# Patient Record
Sex: FEMALE | Race: WHITE | NOT HISPANIC OR LATINO | Employment: FULL TIME
[De-identification: names, ages, dates, MRNs, and addresses within clinical notes are randomized per-mention and may not be internally consistent; named-entity substitution may affect disease eponyms.]

---
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
## 2019-12-09 ENCOUNTER — COMMUNICATION - HEALTHEAST (OUTPATIENT)
Dept: FAMILY MEDICINE | Facility: CLINIC | Age: 19
End: 2019-12-09

## 2020-01-27 ENCOUNTER — COMMUNICATION - HEALTHEAST (OUTPATIENT)
Dept: TELEHEALTH | Facility: CLINIC | Age: 20
End: 2020-01-27

## 2020-01-27 ENCOUNTER — OFFICE VISIT - HEALTHEAST (OUTPATIENT)
Dept: FAMILY MEDICINE | Facility: CLINIC | Age: 20
End: 2020-01-27

## 2020-01-27 DIAGNOSIS — R79.89 ELEVATED LFTS: ICD-10-CM

## 2020-01-27 DIAGNOSIS — L70.9 ACNE, UNSPECIFIED ACNE TYPE: ICD-10-CM

## 2020-01-27 LAB
ALBUMIN SERPL-MCNC: 4.5 G/DL (ref 3.5–5)
ALP SERPL-CCNC: 54 U/L (ref 45–120)
ALT SERPL W P-5'-P-CCNC: 136 U/L (ref 0–45)
ANION GAP SERPL CALCULATED.3IONS-SCNC: 10 MMOL/L (ref 5–18)
AST SERPL W P-5'-P-CCNC: 401 U/L (ref 0–40)
BILIRUB SERPL-MCNC: 0.8 MG/DL (ref 0–1)
BUN SERPL-MCNC: 13 MG/DL (ref 8–22)
CALCIUM SERPL-MCNC: 9.9 MG/DL (ref 8.5–10.5)
CHLORIDE BLD-SCNC: 102 MMOL/L (ref 98–107)
CO2 SERPL-SCNC: 25 MMOL/L (ref 22–31)
CREAT SERPL-MCNC: 0.78 MG/DL (ref 0.6–1.1)
GFR SERPL CREATININE-BSD FRML MDRD: >60 ML/MIN/1.73M2
GLUCOSE BLD-MCNC: 93 MG/DL (ref 70–125)
POTASSIUM BLD-SCNC: 4.3 MMOL/L (ref 3.5–5)
PROT SERPL-MCNC: 7.3 G/DL (ref 6–8)
SODIUM SERPL-SCNC: 137 MMOL/L (ref 136–145)

## 2020-01-27 ASSESSMENT — MIFFLIN-ST. JEOR: SCORE: 1358.94

## 2020-01-28 ENCOUNTER — RECORDS - HEALTHEAST (OUTPATIENT)
Dept: ADMINISTRATIVE | Facility: OTHER | Age: 20
End: 2020-01-28

## 2020-01-28 LAB
HBV SURFACE AB SERPL IA-ACNC: NEGATIVE M[IU]/ML
HBV SURFACE AG SERPL QL IA: NEGATIVE
HCV AB SERPL QL IA: NEGATIVE

## 2020-01-29 LAB — DHEA-S SERPL-MCNC: 153 UG/DL (ref 65–380)

## 2020-01-30 ENCOUNTER — COMMUNICATION - HEALTHEAST (OUTPATIENT)
Dept: FAMILY MEDICINE | Facility: CLINIC | Age: 20
End: 2020-01-30

## 2020-01-30 LAB
SHBG SERPL-SCNC: 57 NMOL/L (ref 30–135)
TESTOST FREE SERPL-MCNC: 0.62 NG/DL (ref 0.08–0.74)
TESTOST SERPL-MCNC: 50 NG/DL (ref 8–60)

## 2020-02-06 ENCOUNTER — RECORDS - HEALTHEAST (OUTPATIENT)
Dept: ADMINISTRATIVE | Facility: OTHER | Age: 20
End: 2020-02-06

## 2020-03-09 ENCOUNTER — COMMUNICATION - HEALTHEAST (OUTPATIENT)
Dept: SCHEDULING | Facility: CLINIC | Age: 20
End: 2020-03-09

## 2021-01-04 ENCOUNTER — RECORDS - HEALTHEAST (OUTPATIENT)
Dept: ADMINISTRATIVE | Facility: OTHER | Age: 21
End: 2021-01-04

## 2021-01-18 ENCOUNTER — RECORDS - HEALTHEAST (OUTPATIENT)
Dept: ADMINISTRATIVE | Facility: OTHER | Age: 21
End: 2021-01-18

## 2021-03-24 ENCOUNTER — RECORDS - HEALTHEAST (OUTPATIENT)
Dept: ADMINISTRATIVE | Facility: OTHER | Age: 21
End: 2021-03-24

## 2021-04-27 ENCOUNTER — OFFICE VISIT - HEALTHEAST (OUTPATIENT)
Dept: FAMILY MEDICINE | Facility: CLINIC | Age: 21
End: 2021-04-27

## 2021-04-27 DIAGNOSIS — B35.3 TINEA PEDIS, UNSPECIFIED LATERALITY: ICD-10-CM

## 2021-04-27 DIAGNOSIS — R79.89 ELEVATED LFTS: ICD-10-CM

## 2021-04-27 DIAGNOSIS — J01.01 ACUTE RECURRENT MAXILLARY SINUSITIS: ICD-10-CM

## 2021-04-27 DIAGNOSIS — R52 BODY ACHES: ICD-10-CM

## 2021-04-27 DIAGNOSIS — B99.9 RECURRENT INFECTIONS: ICD-10-CM

## 2021-04-27 LAB
ALBUMIN SERPL-MCNC: 3.7 G/DL (ref 3.5–5)
ALP SERPL-CCNC: 57 U/L (ref 45–120)
ALT SERPL W P-5'-P-CCNC: 11 U/L (ref 0–45)
AST SERPL W P-5'-P-CCNC: 27 U/L (ref 0–40)
BASOPHILS # BLD AUTO: 0.1 THOU/UL (ref 0–0.2)
BASOPHILS NFR BLD AUTO: 1 % (ref 0–2)
BILIRUB DIRECT SERPL-MCNC: 0.2 MG/DL
BILIRUB SERPL-MCNC: 0.5 MG/DL (ref 0–1)
C REACTIVE PROTEIN LHE: 0.9 MG/DL (ref 0–0.8)
EOSINOPHIL # BLD AUTO: 0.1 THOU/UL (ref 0–0.4)
EOSINOPHIL NFR BLD AUTO: 2 % (ref 0–6)
ERYTHROCYTE [DISTWIDTH] IN BLOOD BY AUTOMATED COUNT: 11.9 % (ref 11–14.5)
ERYTHROCYTE [SEDIMENTATION RATE] IN BLOOD BY WESTERGREN METHOD: 8 MM/HR (ref 0–20)
HCT VFR BLD AUTO: 39.1 % (ref 35–47)
HGB BLD-MCNC: 12.9 G/DL (ref 12–16)
IMM GRANULOCYTES # BLD: 0 THOU/UL
IMM GRANULOCYTES NFR BLD: 0 %
LYMPHOCYTES # BLD AUTO: 1.7 THOU/UL (ref 0.8–4.4)
LYMPHOCYTES NFR BLD AUTO: 25 % (ref 20–40)
MCH RBC QN AUTO: 30.5 PG (ref 27–34)
MCHC RBC AUTO-ENTMCNC: 33 G/DL (ref 32–36)
MCV RBC AUTO: 92 FL (ref 80–100)
MONOCYTES # BLD AUTO: 0.6 THOU/UL (ref 0–0.9)
MONOCYTES NFR BLD AUTO: 10 % (ref 2–10)
MONOCYTES NFR BLD AUTO: NEGATIVE %
NEUTROPHILS # BLD AUTO: 4.2 THOU/UL (ref 2–7.7)
NEUTROPHILS NFR BLD AUTO: 63 % (ref 50–70)
PLATELET # BLD AUTO: 272 THOU/UL (ref 140–440)
PMV BLD AUTO: 9.9 FL (ref 7–10)
PROT SERPL-MCNC: 6.7 G/DL (ref 6–8)
RBC # BLD AUTO: 4.23 MILL/UL (ref 3.8–5.4)
WBC: 6.6 THOU/UL (ref 4–11)

## 2021-04-27 ASSESSMENT — MIFFLIN-ST. JEOR: SCORE: 1407.25

## 2021-04-28 ENCOUNTER — COMMUNICATION - HEALTHEAST (OUTPATIENT)
Dept: FAMILY MEDICINE | Facility: CLINIC | Age: 21
End: 2021-04-28

## 2021-05-26 ENCOUNTER — RECORDS - HEALTHEAST (OUTPATIENT)
Dept: ADMINISTRATIVE | Facility: CLINIC | Age: 21
End: 2021-05-26

## 2021-06-04 VITALS
HEART RATE: 83 BPM | BODY MASS INDEX: 20.46 KG/M2 | OXYGEN SATURATION: 99 % | WEIGHT: 127.3 LBS | SYSTOLIC BLOOD PRESSURE: 100 MMHG | DIASTOLIC BLOOD PRESSURE: 56 MMHG | HEIGHT: 66 IN

## 2021-06-04 NOTE — TELEPHONE ENCOUNTER
New Appointment Needed  What is the reason for the visit:    New patient  for acne, patient has been seen by a dermatologist & treated with no improvement. Discuss possible testing testosterone levels. Patients sister is a current patient, Dr Ocampo agreed to take her as a patient, per Mom.  Provider Preference: Dr Ocampo  How soon do you need to be seen?: when available  Waitlist offered?: No  Okay to leave a detailed message:  Yes

## 2021-06-04 NOTE — TELEPHONE ENCOUNTER
Patient Returning Call  Reason for call:  Returning call  Information relayed to patient:  Mom aware Dr. Ocampo will see patient.  PSR not able to set up visit as blocked because doctor is not set up in system to take New Patient or Establish Care patient.  Patient has additional questions:  Yes  If YES, what are your questions/concerns:  Please call mom Marilyn to set up this appointment for patient.  Patient will be off from college tomorrow and they will take anytime to come in to see Dr. Ocampo, however, would prefer Tuesday or Thursday if possible. Patient goes back to college the first or second week of January and may need to leave sooner for work.   -Mom also explained that the antibiotic treatment prescribed for her daughter's face, made it worse and she has learned through research that some people have hormonal acne, and mom is wondering if testosterone levels can be checked.   Okay to leave a detailed message?: Yes

## 2021-06-05 VITALS
WEIGHT: 139 LBS | BODY MASS INDEX: 22.34 KG/M2 | DIASTOLIC BLOOD PRESSURE: 60 MMHG | TEMPERATURE: 98.2 F | HEIGHT: 66 IN | HEART RATE: 100 BPM | SYSTOLIC BLOOD PRESSURE: 100 MMHG | OXYGEN SATURATION: 100 %

## 2021-06-05 NOTE — TELEPHONE ENCOUNTER
Patient Returning Call  Reason for call:  Return call.  Information relayed to patient:  Patient's mom, Marilyn, was informed of Dr. Ocampo's message below.  Patient has additional questions:  No  If YES, what are your questions/concerns:  n/a  Okay to leave a detailed message?: No call back needed    Caller stated the patient is not on an antibiotic from her dermatologist. Caller stated she will let the patient know and the patient will call back to set up a lab appointment.

## 2021-06-05 NOTE — TELEPHONE ENCOUNTER
Left message to call back for: pt mom Marilyn  Information to relay to patient:  Please relay Dr. Ocampo's message.

## 2021-06-05 NOTE — TELEPHONE ENCOUNTER
Your hormones are normal in regards to testosterone which is great news.     Your hepatitis tests are normal. You have not developed immunity to hepatitis B, you may want to consider trying the hepatitis B series one more time to see if we can get you to respond to this.     Your liver tests are up some still. Are you taking an oral antibiotic from the dermatologist as well? If you are not I think that you should stop the spironolactone and we can recheck your tests in a few weeks to make sure they are coming down.

## 2021-06-05 NOTE — PROGRESS NOTES
ASSESSMENT/PLAN:       1. Acne, unspecified acne type  -The patient is concerned that there is a hormonal problem, and we are checking basic labs as listed below which thus far are normal.  Discussed they are treating her for hormonal problem with spironolactone and the next step would be considering birth control.  In the meantime I encouraged her to increase the frequency of her Retin-A and she should plan on following up here in the next several months.  - Dehydroepiandrosterone Sulfate, Serum (DHEAS)  - Testosterone, Free and Total  - clindamycin (CLINDAGEL) 1 % gel; Apply topically each morning  Dispense: 30 g; Refill: 2  - tretinoin (RETIN-A) 0.025 % cream; Apply topically at bedtime.  Dispense: 45 g; Refill: 2  - spironolactone (ALDACTONE) 50 MG tablet; Take 1 tablet (50 mg total) by mouth 2 (two) times a day.  Dispense: 60 tablet; Refill: 2    2. Elevated LFTs  -LFTs were elevated from an outside clinic, I am going to check some basic labs including a recheck of her LFTs as well as hepatitis B and C status.  And then from there treat accordingly.  - Comprehensive Metabolic Panel  - Hepatitis B Surface Antibody (Anti-HBs) Vaccine Check  - Hepatitis C Antibody (Anti-HCV)  - Hepatitis B Surface Antigen (HBsAG)      Return in about 3 months (around 4/27/2020).      Melisa Ocampo MD      PROGRESS NOTE   1/27/2020    SUBJECTIVE:  Hannah E Tietz is a 19 y.o. female  who presents for acne and elvated liver tests.     [facility]. She got the clindamycin, retin A and spironolactone.     She was on the 0.05% of Retin A and they did get 0.025% as she got a lot of dryness with the higher strength one. She hasn't used this one yet. She does have the 0.05% and is using this one more sparingly and that is helping. She is worried because things are not going away. She is worried that there is something that is missing since it is not going away. She is not on birth control.      Her periods are regular, she will have bleeding for almost 7 days, she has had increased cramping in the last few months. She does have 25 days in between cycles.     She did have some lab work done at the beginning of January and she did have elevated LFTs. She does not drink alcohol really. This concerns her as well. Her homeopathic doctor recommended that she see someone about this.     Chief Complaint   Patient presents with     Acne     Patient would like to discuss her facial acne. Patient does have some acne on her chest and her upper back. Patient is followed by a dermatologist.      Follow-up     Patient would like to follow up on lab work that was recently done.          Patient Active Problem List   Diagnosis     Attention deficit disorder       Current Outpatient Medications   Medication Sig Dispense Refill     clindamycin (CLINDAGEL) 1 % gel Apply topically each morning 30 g 2     spironolactone (ALDACTONE) 50 MG tablet Take 1 tablet (50 mg total) by mouth 2 (two) times a day. 60 tablet 2     tretinoin (RETIN-A) 0.05 % cream        tretinoin (RETIN-A) 0.025 % cream Apply topically at bedtime. 45 g 2     No current facility-administered medications for this visit.      Past Medical History:   Diagnosis Date     Anxiety      Past Surgical History:   Procedure Laterality Date     ANKLE SURGERY Right        family history includes Anxiety disorder in her father and mother; Breast cancer in her maternal grandmother; Depression in her father and mother; Diabetes in her paternal grandmother; No Medical Problems in her maternal grandfather and paternal grandfather.      Social History     Tobacco Use   Smoking Status Never Smoker   Smokeless Tobacco Never Used       With the exception of the aforementioned issues, 12 point, comprehensive ROS was done and was negative.     OBJECTIVE:        Recent Results (from the past 240 hour(s))   Comprehensive Metabolic Panel   Result Value Ref Range    Sodium 137 136 -  "145 mmol/L    Potassium 4.3 3.5 - 5.0 mmol/L    Chloride 102 98 - 107 mmol/L    CO2 25 22 - 31 mmol/L    Anion Gap, Calculation 10 5 - 18 mmol/L    Glucose 93 70 - 125 mg/dL    BUN 13 8 - 22 mg/dL    Creatinine 0.78 0.60 - 1.10 mg/dL    GFR MDRD Af Amer >60 >60 mL/min/1.73m2    GFR MDRD Non Af Amer >60 >60 mL/min/1.73m2    Bilirubin, Total 0.8 0.0 - 1.0 mg/dL    Calcium 9.9 8.5 - 10.5 mg/dL    Protein, Total 7.3 6.0 - 8.0 g/dL    Albumin 4.5 3.5 - 5.0 g/dL    Alkaline Phosphatase 54 45 - 120 U/L     (H) 0 - 40 U/L     (H) 0 - 45 U/L   Dehydroepiandrosterone Sulfate, Serum (DHEAS)   Result Value Ref Range    DHEAS 153 65 - 380 ug/dL   Hepatitis B Surface Antibody (Anti-HBs) Vaccine Check   Result Value Ref Range    HBV Surface Ab (Vaccine Check) Negative (!) Positive   Hepatitis C Antibody (Anti-HCV)   Result Value Ref Range    Hepatitis C Ab Negative Negative   Hepatitis B Surface Antigen (HBsAG)   Result Value Ref Range    Hepatitis B Surface Ag Negative Negative       Vitals:    01/27/20 1242   BP: 100/56   Patient Site: Right Arm   Patient Position: Sitting   Cuff Size: Adult Regular   Pulse: 83   SpO2: 99%   Weight: 127 lb 4.8 oz (57.7 kg)   Height: 5' 6.3\" (1.684 m)     Weight: 127 lb 4.8 oz (57.7 kg)          Physical Exam:  GENERAL APPEARANCE: A&A, NAD, well hydrated, well nourished  SKIN:  Normal skin turgor, no lesions/rashes, moderate inflammatory acne present on the forehead and chin, no obvious cystic acne present currently   HEENT: moist mucous membranes, no rhinorrhea  NECK: Normal  CV: RRR, no M/G/R   LUNGS: CTAB  ABDOMEN: S&NT, no masses   EXTREMITY: no edema   NEURO: no gross deficits  PSYCH: affect is flat, she is well dressed and groomed, normal thought process and speech pattern     "

## 2021-06-05 NOTE — TELEPHONE ENCOUNTER
Test Results  Who is calling?:  Patient's motherMarilyn  Who ordered the test:    Melisa Ocampo MD  Type of test: Lab  Date of test:  1/27/2020  Where was the test performed:    [facility]  What are your questions/concerns?:    Patient's mother is wanting to discuss lab results with Provider to reassure patient.  Okay to leave a detailed message?:  Yes

## 2021-06-06 NOTE — TELEPHONE ENCOUNTER
Medication Request  Medication name: Oral contraceptives   Requested Pharmacy: [facility]  Reason for request: Birth control    When did you use medication last?:  NA   Patient offered appointment:  yes. The patient has an appointment with Dr. Peguero on 3/12/20. The patient's mom is wandering if Iesha needs to keep this appointment or can the birth control be ordered without appointment?  Okay to leave a detailed message: yes

## 2021-06-17 NOTE — PATIENT INSTRUCTIONS - HE
Updating some labs today to assess for mono and those inflammtory markers.    Rechecking the liver enzymes.      We'll call you when these come back.    Teraaliyahafine sent for the toe. Keep it as dry as possible.

## 2021-06-17 NOTE — PROGRESS NOTES
"Chief Complaint   Patient presents with     Sinus Problem     sinus issues on goign for 1 month, not sleeping, sinus drainage, stomach issues, nasueated , achey joints        HPI: Patient presents today with concerns of recurrent illness.  She describes them as a 24-hour illness that self resolves and comes back.  She notices sinus congestion and \"cold \"type symptoms.  Sometimes feels nauseated.  Joints are achy at times.  Bowel movements feel a little softer than usual.  Generally not sleeping as well as usual.  When she blows her nose she is getting out thick yellow mucus.  No significant cough or hemoptysis.  No unintentional weight loss.  Has felt feverish once or twice.  Last week she did feel like some of her submandibular glands were swollen.  No nighttime diaphoresis, travel outside the country, or residence in assisted/homeless shelter.  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  Ears feel fine.  No eye issues.  Rare alcohol use.  No risky sexual behavior.  No injection drug use.  Denies seasonal allergies and new pets in the house.  She does admit to being under a significant amount of life stress right now.  Patient has a history of elevated LFTs in the past last rechecked about a year ago.  Lastly, she notices a mild amount of redness and itchiness between her toes.    ROS:Review of Systems - negative except for what's listed in the HPI    SH: The Patient's  reports that she has never smoked. She has never used smokeless tobacco. She reports current alcohol use. She reports that she does not use drugs.      FH: The Patient's family history includes Anxiety disorder in her father and mother; Breast cancer in her maternal grandmother; Depression in her father and mother; Diabetes in her paternal grandmother; No Medical Problems in her maternal grandfather, paternal grandfather, and sister.     Meds:    Current Outpatient Medications on File Prior to Visit   Medication Sig Dispense Refill     " "spironolactone (ALDACTONE) 50 MG tablet Take 1 tablet (50 mg total) by mouth 2 (two) times a day. 60 tablet 2     clindamycin (CLINDAGEL) 1 % gel Apply topically each morning 30 g 2     tretinoin (RETIN-A) 0.025 % cream Apply topically at bedtime. 45 g 2     tretinoin (RETIN-A) 0.05 % cream        VESTURA, 28, 3-0.02 mg per tablet TAKE 1 TABLET BY MOUTH DAILY, TAKE ACTIVE PILLS ONLY FOR 3 CYCLES THEN TAKE 7 DAYS INACTIVE       No current facility-administered medications on file prior to visit.        O:  /60   Pulse 100   Temp 98.2  F (36.8  C)   Ht 5' 6\" (1.676 m)   Wt 139 lb (63 kg)   LMP 04/14/2021 (Approximate)   SpO2 100%   Breastfeeding No   BMI 22.44 kg/m      Physical Examination:   General appearance - alert, well appearing, and in no distress  Mental status - alert, oriented to person, place, and time  Eyes -PERRLA  Ears - bilateral TM's and external ear canals normal  Nose -off colored white/yellow discharge noted.  Mild mucosal swelling.  Mouth - mucous membranes moist. No oral lesions.  Neck - no significant adenopathy  Lymphatics - no palpable lymphadenopathy  Chest - clear to auscultation, no wheezes, rales or rhonchi, symmetric air entry  Heart - normal rate and regular rhythm, S1 and S2 normal, no murmurs noted  Neurological - alert, oriented, normal speech, no focal findings or movement disorder noted, neck supple without rigidity, cranial nerves II through XII intact, motor and sensory grossly normal bilaterally, normal muscle tone, no tremors, strength 5/5  Extremities - peripheral pulses normal, no peripheral edema  Skin -between toe #2 and 1, there is a small amount of erythema.  Not hot to touch.  No blistering.  Some peeling skin noted.      A/P:     Problem List Items Addressed This Visit     None      Visit Diagnoses     Elevated LFTs    -  Primary    Relevant Orders    Hepatic Profile (Completed)    Mononucleosis Screen (Completed)    Acute recurrent maxillary sinusitis     "    Relevant Medications    azithromycin (ZITHROMAX Z-HEATH) 250 MG tablet    Tinea pedis, unspecified laterality        Relevant Medications    terbinafine HCL (LAMISIL) 1 % cream    Body aches        Relevant Orders    Sedimentation Rate (Completed)    C-Reactive Protein(CRP) (Completed)    Recurrent infections        Relevant Medications    azithromycin (ZITHROMAX Z-HEATH) 250 MG tablet    Other Relevant Orders    HM1(CBC and Differential) (Completed)        Reviewed outside records from gastroenterology along with outside LFTs, IgG, IgM, IgE.  Suspect tinea pedis in between the toes given appearance and location.  Trial of terbinafine.  Given off-and-on illness for a month, will start with some simple labs.  Given body aches, also assess for inflammatory markers.  All these came back looking normal.  No recurrent rise in LFTs.  Start with azithromycin given purulent drainage and recurrent ill symptoms. Let me know if not improving.      1. Acute recurrent maxillary sinusitis  - azithromycin (ZITHROMAX Z-HEATH) 250 MG tablet; Take 2 tablets (500 mg) on  Day 1,  followed by 1 tablet (250 mg) once daily on Days 2 through 5.  Dispense: 6 tablet; Refill: 0    2. Elevated LFTs  - Hepatic Profile  - Mononucleosis Screen    3. Tinea pedis, unspecified laterality  - terbinafine HCL (LAMISIL) 1 % cream; Apply topically at bedtime.  Dispense: 15 g; Refill: 0    4. Body aches  - Sedimentation Rate  - C-Reactive Protein(CRP)    5. Recurrent infections  - HM1(CBC and Differential)        Vikas Reza, CNP      This note has been dictated using voice recognition software. Any grammatical or context distortions are unintentional and inherent to the software.

## 2021-06-17 NOTE — TELEPHONE ENCOUNTER
----- Message from Vikas Reza CNP sent at 4/28/2021 12:10 PM CDT -----  Please call the patient.  All of her labs came back looking good.  All the liver tests have self corrected.  No mono.  Inflammatory markers are good.  Blood counts are also normal.    Given her feelings of sickness recurrently along with the nasal congestion with colored mucus, let's start by treating like this is a respiratory infection.  I sent azithromycin to the pharmacy.  Let me know in 2 weeks if this is not improving.    Vikas Reza CNP

## 2021-06-17 NOTE — TELEPHONE ENCOUNTER
Left message to call back for: Pt.   Information to relay to patient:  Information below.     Michelle Raygoza, CMA

## 2021-08-08 ENCOUNTER — HEALTH MAINTENANCE LETTER (OUTPATIENT)
Age: 21
End: 2021-08-08

## 2021-08-24 ENCOUNTER — OFFICE VISIT (OUTPATIENT)
Dept: FAMILY MEDICINE | Facility: CLINIC | Age: 21
End: 2021-08-24
Payer: COMMERCIAL

## 2021-08-24 VITALS
OXYGEN SATURATION: 99 % | BODY MASS INDEX: 21.79 KG/M2 | HEART RATE: 99 BPM | WEIGHT: 135 LBS | TEMPERATURE: 98.5 F | RESPIRATION RATE: 18 BRPM | SYSTOLIC BLOOD PRESSURE: 124 MMHG | DIASTOLIC BLOOD PRESSURE: 69 MMHG

## 2021-08-24 DIAGNOSIS — R10.84 ABDOMINAL PAIN, GENERALIZED: ICD-10-CM

## 2021-08-24 DIAGNOSIS — N89.8 VAGINAL DISCHARGE: Primary | ICD-10-CM

## 2021-08-24 LAB
ALBUMIN UR-MCNC: NEGATIVE MG/DL
APPEARANCE UR: CLEAR
BACTERIA #/AREA URNS HPF: ABNORMAL /HPF
BILIRUB UR QL STRIP: NEGATIVE
CLUE CELLS: ABNORMAL
COLOR UR AUTO: YELLOW
GLUCOSE UR STRIP-MCNC: NEGATIVE MG/DL
HCG UR QL: NEGATIVE
HGB UR QL STRIP: ABNORMAL
KETONES UR STRIP-MCNC: NEGATIVE MG/DL
LEUKOCYTE ESTERASE UR QL STRIP: NEGATIVE
NITRATE UR QL: NEGATIVE
PH UR STRIP: 6 [PH] (ref 5–8)
RBC #/AREA URNS AUTO: ABNORMAL /HPF
SP GR UR STRIP: 1.02 (ref 1–1.03)
SQUAMOUS #/AREA URNS AUTO: ABNORMAL /LPF
TRICHOMONAS, WET PREP: ABNORMAL
UROBILINOGEN UR STRIP-ACNC: 0.2 E.U./DL
WBC #/AREA URNS AUTO: ABNORMAL /HPF
WBC'S/HIGH POWER FIELD, WET PREP: ABNORMAL
YEAST, WET PREP: ABNORMAL

## 2021-08-24 PROCEDURE — 81001 URINALYSIS AUTO W/SCOPE: CPT | Performed by: PHYSICIAN ASSISTANT

## 2021-08-24 PROCEDURE — 99213 OFFICE O/P EST LOW 20 MIN: CPT | Performed by: PHYSICIAN ASSISTANT

## 2021-08-24 PROCEDURE — 87210 SMEAR WET MOUNT SALINE/INK: CPT | Performed by: PHYSICIAN ASSISTANT

## 2021-08-24 PROCEDURE — 81025 URINE PREGNANCY TEST: CPT | Performed by: PHYSICIAN ASSISTANT

## 2021-08-24 RX ORDER — SPIRONOLACTONE 50 MG/1
50 TABLET, FILM COATED ORAL
COMMUNITY
Start: 2020-01-27 | End: 2022-07-08

## 2021-08-24 RX ORDER — DROSPIRENONE AND ETHINYL ESTRADIOL 0.02-3(28)
KIT ORAL
COMMUNITY
Start: 2021-04-20 | End: 2022-11-22

## 2021-08-24 NOTE — PATIENT INSTRUCTIONS
Patient Education     Heavy Menstrual Bleeding    Heavy menstrual bleeding means that your periods are heavier or longer than normal. You may soak through a pad or tampon every 1 to 3 hours on the heaviest days of your period. You may also pass large, dark clots. And your periods may last longer than 7 days.   If you have heavy periods often, this can cause a problem called anemia. With anemia, your red blood cell count is too low. Red blood cells are needed as they help carry oxygen all over your body. Severe anemia may make you look pale and feel weak or tired. You might also get short of breath easily.   There are many possible causes of heavy menstrual bleeding. Hormonal imbalance is the most common cause. Having noncancer (benign) growths in your uterus is another cause. These include fibroids or polyps. Taking certain medicines or having certain health problems or bleeding disorders are also causes.   To treat heavy menstrual bleeding, your healthcare provider may prescribe medicines first. If these don t help, you may need more testing and treatments.   Home care  Medicines  If you re prescribed medicines, take them as directed.     To help control heavy bleeding, any of these may be used:  ? Hormone therapy (this includes all types of hormonal birth control such as pills, shots, cream, ring, patch, or hormone-releasing IUD)  ? Nonsteroidal anti-inflammatory drugs (NSAIDs), such as ibuprofen  ? Antifibrinolytic medicines, such as transexamic acid    To help treat anemia, iron supplements may be prescribed.            General care    Get plenty of rest if you tire easily. Avoid heavy exertion.    To help ease pain or cramping, try using a heating pad on the lower belly or back. A warm bath may also help.    Follow-up care  Follow up with your healthcare provider, or as advised.   When to get medical advice  Call your healthcare provider right away if any of these occur:     Heavier bleeding (soaking 1 pad or  tampon every hour for 3 hours)    Heavy bleeding that lasts longer than 1 week    Fever of 100.4 F (38 C) or higher, or as directed by your provider    Pain or cramping that gets worse instead of better    Signs of anemia such as pale skin, extreme tiredness or weakness, or shortness of breath    Dizziness or fainting  Tony last reviewed this educational content on 6/1/2020 2000-2021 [employer]. All rights reserved. This information is not intended as a substitute for professional medical care. Always follow your healthcare professional's instructions.

## 2021-08-24 NOTE — PROGRESS NOTES
"  Assessment & Plan:      Problem List Items Addressed This Visit     None      Visit Diagnoses     Vaginal discharge    -  Primary    Relevant Orders    Wet prep - Clinic Collect (Completed)    HCG qualitative urine (Completed)    Abdominal pain, generalized        Relevant Orders    UA macro with reflex to Microscopic and Culture - Clinc Collect (Completed)    Urine Microscopic (Completed)        Medical Decision Making  Patient presents with ongoing vaginal discharge and suprapubic abdominal pains for 1.5 months.  Negative wet prep.  Urine analysis was negative for UTI.  Trace hematuria seen is likely contaminant from patient's vaginal discharge.  Suspect patient likely having ongoing vaginal bleeding.  Also obtained urine pregnancy test, which was negative, to rule out ectopic pregnancy.  No specific point tenderness on abdominal exam and no signs of hernia.  Suspect patient having breakthrough menstrual bleeding as she normally has an irregular menstrual cycle.  Recommend she follow-up with primary care or OB/GYN within 72 hours for further evaluation of symptoms.  Discussed signs of worsening symptoms and when to be seen immediately for reevaluation.     Subjective:      Hannah E Tietz is a 21 year old female here for evaluation of suprapubic abdominal pressure and vaginal discharge.  Onset of symptoms was 1.5 months ago.  Patient initially developed white, thick vaginal discharge.  This then resolved and turned into a looser brown vaginal discharge about 2 weeks ago.  Patient noted a suprapubic pressure over the last few weeks described as menstrual cramping pains.  Patient is currently on oral contraceptives.  She usually gets her menstrual cycle once every other month.  Last menstrual cycle was in early July.  Patient otherwise denies fevers, nausea, vomiting, significant abdominal pains, and urinary symptoms.  She does note which she describes as a small \"bump\" along the left lower abdomen.  Patient is " only able to see this change when she is standing up.  She also notes slightly looser stools after she initiated oral antibiotics.  Patient denies concerns for STDs, stating that she has been with the same partner for several years.     The following portions of the patient's history were reviewed and updated as appropriate: allergies, current medications, and problem list.     Review of Systems  Pertinent items are noted in HPI.    Allergies  No Known Allergies    Family History   Problem Relation Age of Onset     Depression Mother      Anxiety Disorder Mother      Depression Father      Anxiety Disorder Father      Breast Cancer Maternal Grandmother      No Known Problems Maternal Grandfather      Diabetes Paternal Grandmother      No Known Problems Paternal Grandfather      No Known Problems Sister        Social History     Tobacco Use     Smoking status: Never Smoker     Smokeless tobacco: Never Used   Substance Use Topics     Alcohol use: Yes     Comment: Alcoholic Drinks/day: RARE         Objective:      /69   Pulse 99   Temp 98.5  F (36.9  C)   Resp 18   Wt 61.2 kg (135 lb)   LMP 07/14/2021 (Approximate)   SpO2 99%   BMI 21.79 kg/m    General appearance - alert, well appearing, and in no distress and non-toxic  Chest - clear to auscultation, no wheezes, rales or rhonchi, symmetric air entry  Heart - normal rate, regular rhythm, normal S1, S2, no murmurs, rubs, clicks or gallops  Abdomen - soft, nontender, nondistended, no masses or organomegaly; no bulging masses of the abdominal wall even with patient standing and bearing down  Back exam - full range of motion, no tenderness, palpable spasm or pain on motion  Skin - normal coloration and turgor, no rashes, no suspicious skin lesions noted     Lab & Imaging Results    Results for orders placed or performed in visit on 08/24/21 (from the past 24 hour(s))   Wet prep - Clinic Collect    Specimen: Vagina; Swab   Result Value Ref Range    Trichomonas  Absent Absent    Yeast Absent Absent    Clue Cells Absent Absent    WBCs/high power field 2+ (A) None   UA macro with reflex to Microscopic and Culture - Clinc Collect    Specimen: Urine, Clean Catch   Result Value Ref Range    Color Urine Yellow Colorless, Straw, Light Yellow, Yellow    Appearance Urine Clear Clear    Glucose Urine Negative Negative mg/dL    Bilirubin Urine Negative Negative    Ketones Urine Negative Negative mg/dL    Specific Gravity Urine 1.025 1.005 - 1.030    Blood Urine Moderate (A) Negative    pH Urine 6.0 5.0 - 8.0    Protein Albumin Urine Negative Negative mg/dL    Urobilinogen Urine 0.2 0.2, 1.0 E.U./dL    Nitrite Urine Negative Negative    Leukocyte Esterase Urine Negative Negative   Urine Microscopic   Result Value Ref Range    Bacteria Urine Few (A) None Seen /HPF    RBC Urine 5-10 (A) 0-2 /HPF /HPF    WBC Urine 0-5 0-5 /HPF /HPF    Squamous Epithelials Urine Few (A) None Seen /LPF    Narrative    Urine Culture not indicated   HCG qualitative urine   Result Value Ref Range    hCG Urine Qualitative Negative Negative       I personally reviewed these results and discussed findings with the patient.

## 2021-08-26 ENCOUNTER — TRANSFERRED RECORDS (OUTPATIENT)
Dept: HEALTH INFORMATION MANAGEMENT | Facility: CLINIC | Age: 21
End: 2021-08-26

## 2021-10-03 ENCOUNTER — HEALTH MAINTENANCE LETTER (OUTPATIENT)
Age: 21
End: 2021-10-03

## 2022-07-08 ENCOUNTER — OFFICE VISIT (OUTPATIENT)
Dept: FAMILY MEDICINE | Facility: CLINIC | Age: 22
End: 2022-07-08
Payer: COMMERCIAL

## 2022-07-08 VITALS
DIASTOLIC BLOOD PRESSURE: 82 MMHG | SYSTOLIC BLOOD PRESSURE: 125 MMHG | HEART RATE: 77 BPM | RESPIRATION RATE: 16 BRPM | TEMPERATURE: 98.5 F | BODY MASS INDEX: 21.9 KG/M2 | OXYGEN SATURATION: 98 % | WEIGHT: 135.7 LBS

## 2022-07-08 DIAGNOSIS — R00.2 PALPITATIONS: Primary | ICD-10-CM

## 2022-07-08 LAB
ANION GAP SERPL CALCULATED.3IONS-SCNC: 11 MMOL/L (ref 7–15)
BUN SERPL-MCNC: 10.5 MG/DL (ref 6–20)
CALCIUM SERPL-MCNC: 9.6 MG/DL (ref 8.6–10)
CHLORIDE SERPL-SCNC: 106 MMOL/L (ref 98–107)
CREAT SERPL-MCNC: 0.79 MG/DL (ref 0.51–0.95)
DEPRECATED HCO3 PLAS-SCNC: 24 MMOL/L (ref 22–29)
ERYTHROCYTE [DISTWIDTH] IN BLOOD BY AUTOMATED COUNT: 11.9 % (ref 10–15)
GFR SERPL CREATININE-BSD FRML MDRD: >90 ML/MIN/1.73M2
GLUCOSE SERPL-MCNC: 102 MG/DL (ref 70–99)
HCT VFR BLD AUTO: 39.6 % (ref 35–47)
HGB BLD-MCNC: 12.8 G/DL (ref 11.7–15.7)
MCH RBC QN AUTO: 29.9 PG (ref 26.5–33)
MCHC RBC AUTO-ENTMCNC: 32.3 G/DL (ref 31.5–36.5)
MCV RBC AUTO: 93 FL (ref 78–100)
PLATELET # BLD AUTO: 254 10E3/UL (ref 150–450)
POTASSIUM SERPL-SCNC: 3.8 MMOL/L (ref 3.4–5.3)
RBC # BLD AUTO: 4.28 10E6/UL (ref 3.8–5.2)
SODIUM SERPL-SCNC: 141 MMOL/L (ref 136–145)
TSH SERPL DL<=0.005 MIU/L-ACNC: 1.03 UIU/ML (ref 0.3–4.2)
WBC # BLD AUTO: 5.2 10E3/UL (ref 4–11)

## 2022-07-08 PROCEDURE — 80048 BASIC METABOLIC PNL TOTAL CA: CPT | Performed by: STUDENT IN AN ORGANIZED HEALTH CARE EDUCATION/TRAINING PROGRAM

## 2022-07-08 PROCEDURE — 85027 COMPLETE CBC AUTOMATED: CPT | Performed by: STUDENT IN AN ORGANIZED HEALTH CARE EDUCATION/TRAINING PROGRAM

## 2022-07-08 PROCEDURE — 36415 COLL VENOUS BLD VENIPUNCTURE: CPT | Performed by: STUDENT IN AN ORGANIZED HEALTH CARE EDUCATION/TRAINING PROGRAM

## 2022-07-08 PROCEDURE — 99213 OFFICE O/P EST LOW 20 MIN: CPT | Performed by: STUDENT IN AN ORGANIZED HEALTH CARE EDUCATION/TRAINING PROGRAM

## 2022-07-08 PROCEDURE — 84443 ASSAY THYROID STIM HORMONE: CPT | Performed by: STUDENT IN AN ORGANIZED HEALTH CARE EDUCATION/TRAINING PROGRAM

## 2022-07-08 NOTE — PROGRESS NOTES
ASSESSMENT/PLAN:  (R00.2) Palpitations  (primary encounter diagnosis)  Comment: Very short burst of palpitations with some inspiratory chest pain that lasts just a couple seconds and happening about 3 times a week; no precipitating factors but does states she has been under a lot of stress lately.  No family history of early cardiac death beyond fathers uncle who had MI.  Mom with history of post partial thyroidectomy hypothyroidism but otherwise unremarkable.  Patient herself otherwise healthy without abnormal physical exam findings.  Not using drugs or alcohol and just on OCP for medications.  Overall unclear etiology but suspect this is potentially anxiety/stretches invoked versus less likely electrolyte or arrhythmia related.  Did discuss options today including potentially EKG but given low utility of spot EKG decided to forego.  Plan:   -CBC with platelets, Basic metabolic panel  (Ca, Cl, CO2, Creat, Gluc, K, Na, BUN), TSH with free T4 reflex  -Discussed following up with worsening or changing  -Discussed future consideration of Zio patch/Holter if persistent and nothing else comes up  -Counseled on stress and sleep importance including close relation between mental health and body      Appropriate PPE worn.    Options for treatment and follow-up care were reviewed with the patient and/or guardian. Hannah E Tietz and/or guardian engaged in the decision making process and verbalized understanding of the options discussed and agreed with the final plan.    See [facility] for orders, medications, letters, patient instructions  This note was dictated with [employer].      Larry Herndon MD    SUBJECTIVE:  Hannah E Tietz is an 22 year old female who presents for the below.    Will get very short burst of palpitations with some inspiratory chest pain that lasts just a couple seconds.  Started noticing in February. Was taking spironolactone in February and then stopped (was on it for acne).  Will happen about 3  "times a week.   Says she can't think of precipitant but does state she has been under a lot of stress recently.  Is on OCP. No changes with that recently.    Doesn't drink, no tobacco or drug use. Rarely OTCs.  Used to take pre-work out a lot but has stopped since this started.  Just uses creatine mix now.    Father's uncle passed away early from an MI.  Otherwise no family history of early sudden cardiac death.    Mom had a \"growth on thyroid\" and had partial thyroidectomy which was then followed by some hypothyroidism.      PMH:   has a past medical history of Anxiety.  Patient Active Problem List   Diagnosis     Attention deficit disorder     Social History     Socioeconomic History     Marital status: Single     Spouse name: None     Number of children: None     Years of education: None     Highest education level: None   Tobacco Use     Smoking status: Never Smoker     Smokeless tobacco: Never Used   Substance and Sexual Activity     Alcohol use: Yes     Comment: Alcoholic Drinks/day: RARE      Drug use: Never     Family History   Problem Relation Age of Onset     Depression Mother      Anxiety Disorder Mother      Depression Father      Anxiety Disorder Father      Breast Cancer Maternal Grandmother      No Known Problems Maternal Grandfather      Diabetes Paternal Grandmother      No Known Problems Paternal Grandfather      No Known Problems Sister        ALLERGIES:  Patient has no known allergies.    Current Outpatient Medications   Medication     drospirenone-ethinyl estradiol (CLARA) 3-0.02 MG tablet     No current facility-administered medications for this visit.         ROS:  ROS is done and is negative for general/constitutional, eye, ENT, Respiratory, cardiovascular, GI, , Skin, musculoskeletal except as noted elsewhere.  All other review of systems negative except as noted elsewhere.    OBJECTIVE:  /82   Pulse 77   Temp 98.5  F (36.9  C) (Oral)   Resp 16   Wt 61.6 kg (135 lb 11.2 oz)   SpO2 " 98%   BMI 21.90 kg/m    General: Sitting comfortably. No acute distress.   HEENT: Conjunctivae are clear without discharge or erythema.  EOMI.  Neck: No masses. No obvious adenopathy.  Thyroid unremarkable without tenderness or nodule.  Respiratory: No respiratory distress. Lung sounds are clear without rales, ronchi, or wheezes.   Cardiac: Regular rate and rhythm without murmur.  Warm and well perfused. Brisk cap refill.  Radial pulses 2+ bilaterally.  Abdominal: Abdomen is soft and non-tender.  Extremities: Upper and lower extremities grossly normal.  Skin: Skin warm without rashes.  Neurological: Motor function is grossly normal.   Psychiatric: Good insight and judgement.  Normal affect.      RESULTS  No results found for any visits on 07/08/22.  No results found for this or any previous visit (from the past 48 hour(s)).

## 2022-09-04 ENCOUNTER — HEALTH MAINTENANCE LETTER (OUTPATIENT)
Age: 22
End: 2022-09-04

## 2022-09-27 ENCOUNTER — OFFICE VISIT (OUTPATIENT)
Dept: FAMILY MEDICINE | Facility: CLINIC | Age: 22
End: 2022-09-27
Payer: COMMERCIAL

## 2022-09-27 VITALS
HEART RATE: 107 BPM | RESPIRATION RATE: 16 BRPM | TEMPERATURE: 100.3 F | BODY MASS INDEX: 22.44 KG/M2 | OXYGEN SATURATION: 99 % | WEIGHT: 139 LBS | DIASTOLIC BLOOD PRESSURE: 69 MMHG | SYSTOLIC BLOOD PRESSURE: 117 MMHG

## 2022-09-27 DIAGNOSIS — U07.1 COVID-19: Primary | ICD-10-CM

## 2022-09-27 PROCEDURE — 99213 OFFICE O/P EST LOW 20 MIN: CPT | Mod: CS | Performed by: PHYSICIAN ASSISTANT

## 2022-09-27 PROCEDURE — U0005 INFEC AGEN DETEC AMPLI PROBE: HCPCS | Performed by: PHYSICIAN ASSISTANT

## 2022-09-27 PROCEDURE — U0003 INFECTIOUS AGENT DETECTION BY NUCLEIC ACID (DNA OR RNA); SEVERE ACUTE RESPIRATORY SYNDROME CORONAVIRUS 2 (SARS-COV-2) (CORONAVIRUS DISEASE [COVID-19]), AMPLIFIED PROBE TECHNIQUE, MAKING USE OF HIGH THROUGHPUT TECHNOLOGIES AS DESCRIBED BY CMS-2020-01-R: HCPCS | Performed by: PHYSICIAN ASSISTANT

## 2022-09-27 RX ORDER — FLUCONAZOLE 150 MG/1
150 TABLET ORAL ONCE
COMMUNITY
End: 2022-11-22

## 2022-09-27 NOTE — LETTER
[facility]  [address] [address]  Phone: [phone]  Fax: [phone]    September 27, 2022        Hannah E Tietz  [address] [address]          To whom it may concern:    RE: Hannah E Tietz    She is excused from work until COVID-19 results return.  If negative, may return as long as no fevers of 100.4 or higher.  If COVID-19 test is positive, must wait 10 days from symptom onset (9/25/2022), as well as no fevers and good improvement of symptoms.      Please contact me for questions or concerns.      Sincerely,        Suresh Urbina PA-C

## 2022-09-27 NOTE — PATIENT INSTRUCTIONS
Cough    You were seen today for a cough. This is likely due to a virus and will improve over the next 1-2 weeks on its own.    Symptom management:  - Drink plenty of non-caffeinated fluids  - Avoid smoke exposure  - May use tylenol or ibuprofen for discomfort  - Drink a warm non-caffeinated tea with honey  - Place a warm humidifier in your bedroom at night  - [employer]'s VaporRub    Reasons to return for re-evaluation:  - Develop a fever 100.4 or higher, current fever worsens, or fever does not improve in 72 hours  - Difficulty breathing or shortness of breath  - Cough continues to worsen including coughing up blood or coughing up thick, colored phlegm  - Unable to tolerate fluids    Otherwise, if symptoms have not improved in 7 days, follow-up with your primary care provider.

## 2022-09-27 NOTE — PROGRESS NOTES
Assessment & Plan:      Problem List Items Addressed This Visit    None  Visit Diagnoses     COVID-19    -  Primary    Relevant Orders    Symptomatic; Yes; 9/25/2022 COVID-19 Virus (Coronavirus) by PCR Nose (Completed)        Medical Decision Making  Patient presents with fevers and cough for 2 to 3 days.  Suspect likely viral upper respiratory infection.  No signs of significant respiratory distress with clear lung auscultation.  Recommend rest, fluids, honey, and over-the-counter analgesics as needed.  Provided a note for work.  Discussed signs of worsening symptoms and when to follow-up with PCP if no symptom improvement.     Subjective:      Hannah E Tietz is a 22 year old female here for evaluation of fevers and cough.  Onset of symptoms was 2 to 3 days ago.  Patient initially started with an irritated throat.  Fevers that began 2 days ago.  Patient notes occasional slight shortness of breath.  She had a negative COVID-19 test at home.  She did have exposure to a case of pneumonia 3 days ago.  Patient is requesting a note for work.     The following portions of the patient's history were reviewed and updated as appropriate: allergies, current medications, and problem list.     Review of Systems  Pertinent items are noted in HPI.    Allergies  No Known Allergies    Family History   Problem Relation Age of Onset     Depression Mother      Anxiety Disorder Mother      Depression Father      Anxiety Disorder Father      Breast Cancer Maternal Grandmother      No Known Problems Maternal Grandfather      Diabetes Paternal Grandmother      No Known Problems Paternal Grandfather      No Known Problems Sister        Social History     Tobacco Use     Smoking status: Never     Smokeless tobacco: Never   Substance Use Topics     Alcohol use: Yes     Comment: Alcoholic Drinks/day: RARE         Objective:      /69   Pulse 107   Temp 100.3  F (37.9  C) (Oral)   Resp 16   Wt 63 kg (139 lb)   SpO2 99%   BMI 22.44  kg/m    General appearance - alert, well appearing, and in no distress and non-toxic  Ears - bilateral TM's and external ear canals normal  Nose - normal and patent, no erythema, discharge or polyps  Mouth - mucous membranes moist, pharynx normal without lesions  Neck - supple, no significant adenopathy  Chest - clear to auscultation, no wheezes, rales or rhonchi, symmetric air entry  Heart - normal rate, regular rhythm, normal S1, S2, no murmurs, rubs, clicks or gallops     Lab & Imaging Results    Results for orders placed or performed in visit on 09/27/22   Symptomatic; Yes; 9/25/2022 COVID-19 Virus (Coronavirus) by PCR Nose     Status: Abnormal    Specimen: Nose; Swab   Result Value Ref Range    SARS CoV2 PCR Positive (A) Negative    Narrative    Testing was performed using the Aptima SARS-CoV-2 Assay on the  [employer] Instrument System. Additional information about this  Emergency Use Authorization (EUA) assay can be found via the Lab  Guide. This test should be ordered for the detection of SARS-CoV-2 in  individuals who meet SARS-CoV-2 clinical and/or epidemiological  criteria. Test performance is unknown in asymptomatic patients. This  test is for in vitro diagnostic use under the FDA EUA for  laboratories certified under CLIA to perform high complexity testing.  This test has not been FDA cleared or approved. A negative result  does not rule out the presence of PCR inhibitors in the specimen or  target RNA in concentration below the limit of detection for the  assay. The possibility of a false negative should be considered if  the patient's recent exposure or clinical presentation suggests  COVID-19. This test was validated by the [facility]. This laboratory is certified under  the Clinical Laboratory Improvement Amendments of 1988 (CLIA-88) as  qualified to perform high complexity laboratory testing.       I personally reviewed these results and discussed  findings with the patient.    The use of Dragon/[email or web address].pl dictation services was used to construct the content of this note; any grammatical errors are non-intentional. Please contact the author directly if you are in need of any clarification.

## 2022-09-28 LAB — SARS-COV-2 RNA RESP QL NAA+PROBE: POSITIVE

## 2022-11-22 ENCOUNTER — OFFICE VISIT (OUTPATIENT)
Dept: FAMILY MEDICINE | Facility: CLINIC | Age: 22
End: 2022-11-22
Payer: COMMERCIAL

## 2022-11-22 VITALS
RESPIRATION RATE: 15 BRPM | OXYGEN SATURATION: 98 % | WEIGHT: 141 LBS | TEMPERATURE: 98.2 F | DIASTOLIC BLOOD PRESSURE: 62 MMHG | SYSTOLIC BLOOD PRESSURE: 118 MMHG | BODY MASS INDEX: 22.76 KG/M2 | HEART RATE: 73 BPM

## 2022-11-22 DIAGNOSIS — Z23 IMMUNIZATION DUE: Primary | ICD-10-CM

## 2022-11-22 DIAGNOSIS — Z11.3 SCREENING FOR STDS (SEXUALLY TRANSMITTED DISEASES): ICD-10-CM

## 2022-11-22 DIAGNOSIS — Z11.4 SCREENING FOR HIV (HUMAN IMMUNODEFICIENCY VIRUS): ICD-10-CM

## 2022-11-22 DIAGNOSIS — R00.2 PALPITATIONS: ICD-10-CM

## 2022-11-22 DIAGNOSIS — Z12.4 CERVICAL CANCER SCREENING: ICD-10-CM

## 2022-11-22 LAB
ATRIAL RATE - MUSE: 77 BPM
DIASTOLIC BLOOD PRESSURE - MUSE: NORMAL MMHG
HBA1C MFR BLD: 5.5 % (ref 0–5.6)
HIV 1+2 AB+HIV1 P24 AG SERPL QL IA: NONREACTIVE
INTERPRETATION ECG - MUSE: NORMAL
P AXIS - MUSE: 24 DEGREES
PR INTERVAL - MUSE: 152 MS
QRS DURATION - MUSE: 84 MS
QT - MUSE: 386 MS
QTC - MUSE: 436 MS
R AXIS - MUSE: 66 DEGREES
SYSTOLIC BLOOD PRESSURE - MUSE: NORMAL MMHG
T AXIS - MUSE: 50 DEGREES
VENTRICULAR RATE- MUSE: 77 BPM

## 2022-11-22 PROCEDURE — 90715 TDAP VACCINE 7 YRS/> IM: CPT | Performed by: FAMILY MEDICINE

## 2022-11-22 PROCEDURE — 93005 ELECTROCARDIOGRAM TRACING: CPT | Performed by: FAMILY MEDICINE

## 2022-11-22 PROCEDURE — 87389 HIV-1 AG W/HIV-1&-2 AB AG IA: CPT | Performed by: FAMILY MEDICINE

## 2022-11-22 PROCEDURE — 93010 ELECTROCARDIOGRAM REPORT: CPT | Performed by: GENERAL ACUTE CARE HOSPITAL

## 2022-11-22 PROCEDURE — 87591 N.GONORRHOEAE DNA AMP PROB: CPT | Performed by: FAMILY MEDICINE

## 2022-11-22 PROCEDURE — 83036 HEMOGLOBIN GLYCOSYLATED A1C: CPT | Performed by: FAMILY MEDICINE

## 2022-11-22 PROCEDURE — 36415 COLL VENOUS BLD VENIPUNCTURE: CPT | Performed by: FAMILY MEDICINE

## 2022-11-22 PROCEDURE — 99213 OFFICE O/P EST LOW 20 MIN: CPT | Mod: 25 | Performed by: FAMILY MEDICINE

## 2022-11-22 PROCEDURE — 87491 CHLMYD TRACH DNA AMP PROBE: CPT | Performed by: FAMILY MEDICINE

## 2022-11-22 PROCEDURE — G0145 SCR C/V CYTO,THINLAYER,RESCR: HCPCS | Performed by: FAMILY MEDICINE

## 2022-11-22 PROCEDURE — 90471 IMMUNIZATION ADMIN: CPT | Performed by: FAMILY MEDICINE

## 2022-11-22 RX ORDER — LACTOBACILLUS RHAMNOSUS GG 10B CELL
1 CAPSULE ORAL 2 TIMES DAILY
COMMUNITY
End: 2024-05-17

## 2022-11-22 NOTE — PROGRESS NOTES
Assessment & Plan     Palpitations  -Discussed possible etiologies including drop in blood sugar as well as PVCs which are the highest likelihood in my opinion.  Checking labs as listed below as well as an EKG.  As her symptoms are now gone she can continue to monitor and if she has recurrence of her symptoms she should let me know and I can refer for event monitoring for further evaluation and assessment.  She is comfortable with this.  -EKG is normal today on my read  - Hemoglobin A1c  - EKG 12-lead, tracing only  - Hemoglobin A1c    Immunization due  - TDAP VACCINE (Adacel, Boostrix)    Cervical cancer screening  -Patient due for Pap today, updated  - Pap Screen only - recommended age 21 - 24 years    Screening for STDs (sexually transmitted diseases)  - NEISSERIA GONORRHOEA PCR  - CHLAMYDIA TRACHOMATIS PCR    Screening for HIV (human immunodeficiency virus)  - HIV Antigen Antibody Combo  - HIV Antigen Antibody Combo                 No follow-ups on file.    Melisa Ocampo MD  [facility]    Virginie Julian is a 22 year old, presenting for the following health issues:  Palpitations (Ongoing for about a year. Feels it about once a day - thinks that maybe sugar affects it. Gets sick from sugar - diarrhea, bloated etc.. ) and Immunization (Needs tetanus shot )      History of Present Illness       Reason for visit:  Need tetanus shot and heart palpitations  Symptom onset:  More than a month  Symptoms include:  Heart palpitations  Symptom intensity:  Mild  Symptom progression:  Improving  Had these symptoms before:  No    She eats 2-3 servings of fruits and vegetables daily.She consumes 0 sweetened beverage(s) daily.She exercises with enough effort to increase her heart rate 60 or more minutes per day.  She exercises with enough effort to increase her heart rate 6 days per week.   She is taking medications regularly.     For the last 11 months she has been having feelings in her  chest. Started infrequently and then started getting to every day. Since the beginning of October she went sugar free and now hasn't noticed it at all.     It would feel like her heart was dropping almost or skipping a beat, like she was super nervous and jumping out of her chest. It would last for a second or two and then go away. Sometimes it would happen two times a day and then go away. She was able to exercise without any change. She has had no headaches, upset stomach shortness of breath. She wouldn't get shaky or sweaty until after, maybe due to anxiety. Maybe a little light headed.     She was not eating a lot of sugar, daily, not a lot but sometimes would get a lot and binge and then would feel awful the following day. Now if she eats two regular sized chocolate bars the following day she will feel sick.     She does need a pap smear today as well.     Review of Systems   Per above      Objective    /62   Pulse 73   Temp 98.2  F (36.8  C)   Resp 15   Wt 64 kg (141 lb)   LMP 10/28/2022 (Approximate)   SpO2 98%   BMI 22.76 kg/m    Body mass index is 22.76 kg/m .  Physical Exam   GENERAL: healthy, alert and no distress  NECK: no adenopathy, no asymmetry, masses, or scars and thyroid normal to palpation  RESP: lungs clear to auscultation - no rales, rhonchi or wheezes  CV: regular rate and rhythm, normal S1 S2, no S3 or S4, no murmur, click or rub, no peripheral edema and peripheral pulses strong  ABDOMEN: soft, nontender, no hepatosplenomegaly, no masses and bowel sounds normal   (female): normal female external genitalia, normal urethral meatus, vaginal mucosa, normal cervix with some white discharge  MS: no gross musculoskeletal defects noted, no edema  PSYCH: mentation appears normal, affect normal/bright    EKG NSR

## 2022-11-23 LAB
C TRACH DNA SPEC QL NAA+PROBE: NEGATIVE
N GONORRHOEA DNA SPEC QL NAA+PROBE: NEGATIVE

## 2022-11-28 LAB
BKR LAB AP GYN ADEQUACY: NORMAL
BKR LAB AP GYN INTERPRETATION: NORMAL
BKR LAB AP HPV REFLEX: NO
BKR LAB AP PREVIOUS ABNORMAL: NORMAL
PATH REPORT.COMMENTS IMP SPEC: NORMAL
PATH REPORT.COMMENTS IMP SPEC: NORMAL
PATH REPORT.RELEVANT HX SPEC: NORMAL

## 2023-08-02 ENCOUNTER — OFFICE VISIT (OUTPATIENT)
Dept: FAMILY MEDICINE | Facility: CLINIC | Age: 23
End: 2023-08-02
Payer: COMMERCIAL

## 2023-08-02 VITALS
OXYGEN SATURATION: 98 % | TEMPERATURE: 97.8 F | WEIGHT: 140.21 LBS | HEART RATE: 72 BPM | BODY MASS INDEX: 22.53 KG/M2 | RESPIRATION RATE: 16 BRPM | HEIGHT: 66 IN | SYSTOLIC BLOOD PRESSURE: 100 MMHG | DIASTOLIC BLOOD PRESSURE: 60 MMHG

## 2023-08-02 DIAGNOSIS — B00.1 COLD SORE: Primary | ICD-10-CM

## 2023-08-02 PROCEDURE — 99213 OFFICE O/P EST LOW 20 MIN: CPT | Performed by: FAMILY MEDICINE

## 2023-08-02 RX ORDER — MULTIVITAMIN WITH IRON
1 TABLET ORAL DAILY
COMMUNITY
End: 2024-05-17

## 2023-08-02 RX ORDER — VALACYCLOVIR HYDROCHLORIDE 1 G/1
2000 TABLET, FILM COATED ORAL 2 TIMES DAILY
Qty: 4 TABLET | Refills: 3 | Status: SHIPPED | OUTPATIENT
Start: 2023-08-02 | End: 2024-05-17

## 2023-08-02 RX ORDER — SWAB
1 SWAB, NON-MEDICATED MISCELLANEOUS DAILY
COMMUNITY
End: 2024-05-17

## 2023-08-02 ASSESSMENT — PAIN SCALES - GENERAL: PAINLEVEL: NO PAIN (0)

## 2023-08-02 NOTE — PROGRESS NOTES
Assessment & Plan     Cold sore  Reviewed nature of condition, common triggers.  Prescription provided for Valtrex that she can initiate at onset of symptoms, instructed in appropriate use and side effects.  Notify us with inadequate effect, worsening, or additional concerns.  - valACYclovir (VALTREX) 1000 mg tablet; Take 2 tablets (2,000 mg) by mouth 2 times daily                 Love Crews MD  [facility] DWAYNE Julian is a 23 year old, presenting for the following health issues:  Cold sore on lip (Hx of cold sores, they have never lasted this long, wants to make sure nothing else is going on)      8/2/2023     1:21 PM   Additional Questions   Roomed by virgil       History of recurrent cold sores.  Here today she has developed a cold sore on her lower lip, onset of symptoms initially yesterday, now with blister formation.  She had a sore area on the inside of her mouth as well which is atypical, and is also developed some tenderness in the lymph nodes of her neck.  Wondering about treatment options.  She will be at a wedding this weekend and is hoping to have it healed by then.    History of Present Illness       Reason for visit:  Cold sore  Symptom onset:  1-3 days ago  Symptoms include:  Cold sore on outside and inside of mouth and swillen lymph nodes  Symptom intensity:  Moderate  Symptom progression:  Worsening  Had these symptoms before:  Yes  Has tried/received treatment for these symptoms:  No  What makes it worse:  No  What makes it better:  No    She eats 2-3 servings of fruits and vegetables daily.She consumes 0 sweetened beverage(s) daily.She exercises with enough effort to increase her heart rate 60 or more minutes per day.  She exercises with enough effort to increase her heart rate 5 days per week.   She is taking medications regularly.               Review of Systems         Objective    /60   Pulse 72   Temp 97.8  F (36.6  C) (Oral)   Resp 16   Ht  "1.676 m (5' 6\")   Wt 63.6 kg (140 lb 3.4 oz)   LMP  (LMP Unknown)   SpO2 98%   BMI 22.63 kg/m    Body mass index is 22.63 kg/m .  Physical Exam   Alert and very pleasant.  Vesicular lesion with mildly erythematous base noted lower lip, just to the right of midline.  No identifiable abnormalities on the inner lip.  Neck supple with shotty anterior cervical lymphadenopathy with mild tenderness.  No overlying skin changes.                    "

## 2023-10-01 ENCOUNTER — HEALTH MAINTENANCE LETTER (OUTPATIENT)
Age: 23
End: 2023-10-01

## 2024-02-19 ENCOUNTER — OFFICE VISIT (OUTPATIENT)
Dept: FAMILY MEDICINE | Facility: CLINIC | Age: 24
End: 2024-02-19
Payer: COMMERCIAL

## 2024-02-19 ENCOUNTER — TELEPHONE (OUTPATIENT)
Dept: URGENT CARE | Facility: URGENT CARE | Age: 24
End: 2024-02-19

## 2024-02-19 VITALS
OXYGEN SATURATION: 99 % | DIASTOLIC BLOOD PRESSURE: 81 MMHG | SYSTOLIC BLOOD PRESSURE: 132 MMHG | RESPIRATION RATE: 16 BRPM | HEART RATE: 111 BPM | TEMPERATURE: 99.2 F

## 2024-02-19 DIAGNOSIS — R50.9 FEVER, UNSPECIFIED FEVER CAUSE: Primary | ICD-10-CM

## 2024-02-19 LAB
ALBUMIN UR-MCNC: ABNORMAL MG/DL
APPEARANCE UR: CLEAR
BACTERIA #/AREA URNS HPF: ABNORMAL /HPF
BILIRUB UR QL STRIP: ABNORMAL
COLOR UR AUTO: YELLOW
DEPRECATED S PYO AG THROAT QL EIA: NEGATIVE
FLUAV AG SPEC QL IA: NEGATIVE
FLUBV AG SPEC QL IA: NEGATIVE
GLUCOSE UR STRIP-MCNC: NEGATIVE MG/DL
GROUP A STREP BY PCR: NOT DETECTED
HGB UR QL STRIP: ABNORMAL
KETONES UR STRIP-MCNC: 15 MG/DL
LEUKOCYTE ESTERASE UR QL STRIP: NEGATIVE
MUCOUS THREADS #/AREA URNS LPF: PRESENT /LPF
NITRATE UR QL: NEGATIVE
PH UR STRIP: 6 [PH] (ref 5–8)
RBC #/AREA URNS AUTO: ABNORMAL /HPF
SP GR UR STRIP: 1.02 (ref 1–1.03)
SQUAMOUS #/AREA URNS AUTO: ABNORMAL /LPF
UROBILINOGEN UR STRIP-ACNC: 0.2 E.U./DL
WBC #/AREA URNS AUTO: ABNORMAL /HPF

## 2024-02-19 PROCEDURE — 87651 STREP A DNA AMP PROBE: CPT | Performed by: PHYSICIAN ASSISTANT

## 2024-02-19 PROCEDURE — 99213 OFFICE O/P EST LOW 20 MIN: CPT | Performed by: PHYSICIAN ASSISTANT

## 2024-02-19 PROCEDURE — 87635 SARS-COV-2 COVID-19 AMP PRB: CPT | Performed by: PHYSICIAN ASSISTANT

## 2024-02-19 PROCEDURE — 87804 INFLUENZA ASSAY W/OPTIC: CPT | Performed by: PHYSICIAN ASSISTANT

## 2024-02-19 PROCEDURE — 81001 URINALYSIS AUTO W/SCOPE: CPT | Performed by: PHYSICIAN ASSISTANT

## 2024-02-19 ASSESSMENT — ENCOUNTER SYMPTOMS
ABDOMINAL PAIN: 1
FEVER: 1
SORE THROAT: 0
FREQUENCY: 0
NAUSEA: 1
DYSURIA: 0
COUGH: 0

## 2024-02-19 NOTE — PROGRESS NOTES
Patient presents with:  Back Pain: Has had headache fever back pain and neck pain for abt 2 days      Clinical Decision Making:  Patient experiencing generalized sick symptoms.  Abdominal exam is not strongly indicative of any surgical emergencies.  UA negative for UTI.  Patient declines pregnancy testing.  RST and influenza test negative.  COVID test in process.  Recommend supportive cares and following up if symptoms fail to improve.  Patient is agreeable to this plan.  Patient's neck pain is not indicative of meningitis, as it is unilateral and not associated with any significant fevers.        ICD-10-CM    1. Fever, unspecified fever cause  R50.9 Streptococcus A Rapid Screen w/Reflex to PCR     Symptomatic COVID-19 Virus (Coronavirus) by PCR Nose     Influenza A & B Antigen - Clinic Collect     UA Macroscopic with reflex to Microscopic and Culture - Clinic Collect     UA Microscopic with Reflex to Culture     Group A Streptococcus PCR Throat Swab          Patient Instructions   1.  Your rapid strep and influenza tests are negative today.  Your urine is negative for signs of urinary tract infection.  2.  COVID test will take about 24 hours.  We will only call you if it is positive.  That will be visible in MyChart.  3.  I recommend alternating between Tylenol and ibuprofen for pain and fever relief.  Applying a heating pad or warm compress to your neck may also help with the muscle strain.  4.  Seek emergency medical attention if you develop severe abdominal pain especially if it is in the right lower area of the belly.    HPI:  Hannah E Tietz is a 24 year old female who presents today complaining of HA, fever, back and neck pain starting yesterday. No measured temps. Not currently on antipyretics.     History obtained from the patient.    Problem List:  2012-04: Attention deficit disorder      Past Medical History:   Diagnosis Date    Anxiety        Social History     Tobacco Use    Smoking status: Never     Smokeless tobacco: Never   Substance Use Topics    Alcohol use: Yes     Comment: Alcoholic Drinks/day: RARE        Review of Systems   Constitutional:  Positive for fever.   HENT:  Positive for ear pain (bilateral, yesterday, resolved). Negative for congestion and sore throat.    Respiratory:  Negative for cough.    Gastrointestinal:  Positive for abdominal pain (suprapubic) and nausea.   Genitourinary:  Negative for dysuria and frequency.       Vitals:    02/19/24 1320   BP: 132/81   Pulse: 111   Resp: 16   Temp: 99.2  F (37.3  C)   TempSrc: Oral   SpO2: 99%       Physical Exam  Vitals and nursing note reviewed.   Constitutional:       General: She is not in acute distress.     Appearance: She is not toxic-appearing or diaphoretic.   HENT:      Head: Normocephalic and atraumatic.      Right Ear: Tympanic membrane, ear canal and external ear normal.      Left Ear: Tympanic membrane, ear canal and external ear normal.      Mouth/Throat:      Pharynx: No oropharyngeal exudate or posterior oropharyngeal erythema.   Eyes:      Conjunctiva/sclera: Conjunctivae normal.   Cardiovascular:      Rate and Rhythm: Normal rate and regular rhythm.      Heart sounds: No murmur heard.  Pulmonary:      Effort: Pulmonary effort is normal. No respiratory distress.      Breath sounds: Normal breath sounds.   Abdominal:      General: Abdomen is flat. There is no distension.      Palpations: Abdomen is soft.      Tenderness: There is abdominal tenderness (periumbilical and suprapubic. Mild). There is no right CVA tenderness, left CVA tenderness, guarding or rebound.   Lymphadenopathy:      Cervical: No cervical adenopathy.   Neurological:      Mental Status: She is alert.   Psychiatric:         Mood and Affect: Mood normal.         Behavior: Behavior normal.         Thought Content: Thought content normal.         Judgment: Judgment normal.         Results:  Results for orders placed or performed in visit on 02/19/24   UA Macroscopic  with reflex to Microscopic and Culture - Clinic Collect     Status: Abnormal    Specimen: Urine, Midstream   Result Value Ref Range    Color Urine Yellow Colorless, Straw, Light Yellow, Yellow    Appearance Urine Clear Clear    Glucose Urine Negative Negative mg/dL    Bilirubin Urine Small (A) Negative    Ketones Urine 15 (A) Negative mg/dL    Specific Gravity Urine 1.025 1.005 - 1.030    Blood Urine Trace (A) Negative    pH Urine 6.0 5.0 - 8.0    Protein Albumin Urine Trace (A) Negative mg/dL    Urobilinogen Urine 0.2 0.2, 1.0 E.U./dL    Nitrite Urine Negative Negative    Leukocyte Esterase Urine Negative Negative   UA Microscopic with Reflex to Culture     Status: Abnormal   Result Value Ref Range    Bacteria Urine Many (A) None Seen /HPF    RBC Urine 5-10 (A) 0-2 /HPF /HPF    WBC Urine 0-5 0-5 /HPF /HPF    Squamous Epithelials Urine Many (A) None Seen /LPF    Mucus Urine Present (A) None Seen /LPF    Narrative    Urine Culture not indicated   Streptococcus A Rapid Screen w/Reflex to PCR     Status: Normal    Specimen: Throat; Swab   Result Value Ref Range    Group A Strep antigen Negative Negative   Influenza A & B Antigen - Clinic Collect     Status: Normal    Specimen: Nose; Swab   Result Value Ref Range    Influenza A antigen Negative Negative    Influenza B antigen Negative Negative    Narrative    Test results must be correlated with clinical data. If necessary, results should be confirmed by a molecular assay or viral culture.         At the end of the encounter, I discussed results, diagnosis, medications. Discussed red flags for immediate return to clinic/ER, as well as indications for follow up if no improvement. Patient understood and agreed to plan. Patient was stable for discharge.

## 2024-02-19 NOTE — TELEPHONE ENCOUNTER
FYI - Status Update    Who is Calling: patient    Update: Patient saw her urine results came back abnormal and is concerned. She is wondering if this means she has a UTI.    Does caller want a call/response back: Yes     Could we send this information to you in [employer] or would you prefer to receive a phone call?:   Patient would prefer a phone call   Okay to leave a detailed message?: Yes at Cell number on file:    Telephone Information:   Mobile [phone]

## 2024-02-19 NOTE — PATIENT INSTRUCTIONS
1.  Your rapid strep and influenza tests are negative today.  Your urine is negative for signs of urinary tract infection.  2.  COVID test will take about 24 hours.  We will only call you if it is positive.  That will be visible in MyChart.  3.  I recommend alternating between Tylenol and ibuprofen for pain and fever relief.  Applying a heating pad or warm compress to your neck may also help with the muscle strain.  4.  Seek emergency medical attention if you develop severe abdominal pain especially if it is in the right lower area of the belly.  5.  Follow-up if no improvement over the course the next 5 days.

## 2024-02-20 LAB — SARS-COV-2 RNA RESP QL NAA+PROBE: NEGATIVE

## 2024-02-29 NOTE — TELEPHONE ENCOUNTER
Call and spoke with patient and relayed provider's message. No further questions.    Charlotte Welch on 2/29/2024 at 12:59 PM

## 2024-02-29 NOTE — TELEPHONE ENCOUNTER
These call patient and notify her of the following:    Patient's urine test results do not show evidence of a urinary tract infection.  The findings on her urine microscopic exam are consistent with a contaminated specimen, not an infection.  No antibiotics are indicated at this time.  She should make a follow-up appointment if her symptoms are continuing with her primary care provider.      Liza Hernandez M.D. 2/29/2024 12:41 PM

## 2024-02-29 NOTE — TELEPHONE ENCOUNTER
Called patient and was inform by pt that nobody has reached out to the patient regarding her results or her questions. Informed patient will route message and give patient a callback as soon as possible.    Charlotte Welch on 2/29/2024 at 10:51 AM

## 2024-05-17 ENCOUNTER — OFFICE VISIT (OUTPATIENT)
Dept: FAMILY MEDICINE | Facility: CLINIC | Age: 24
End: 2024-05-17
Payer: COMMERCIAL

## 2024-05-17 VITALS
HEIGHT: 66 IN | BODY MASS INDEX: 21.76 KG/M2 | RESPIRATION RATE: 10 BRPM | DIASTOLIC BLOOD PRESSURE: 75 MMHG | HEART RATE: 71 BPM | OXYGEN SATURATION: 100 % | WEIGHT: 135.4 LBS | TEMPERATURE: 98.5 F | SYSTOLIC BLOOD PRESSURE: 114 MMHG

## 2024-05-17 DIAGNOSIS — F32.1 CURRENT MODERATE EPISODE OF MAJOR DEPRESSIVE DISORDER, UNSPECIFIED WHETHER RECURRENT (H): ICD-10-CM

## 2024-05-17 DIAGNOSIS — R53.82 CHRONIC FATIGUE: Primary | ICD-10-CM

## 2024-05-17 DIAGNOSIS — L72.11 PILAR CYST OF SCALP: ICD-10-CM

## 2024-05-17 PROBLEM — G56.20 ULNAR NERVE ENTRAPMENT AT ELBOW: Status: ACTIVE | Noted: 2022-04-13

## 2024-05-17 LAB
ERYTHROCYTE [DISTWIDTH] IN BLOOD BY AUTOMATED COUNT: 12 % (ref 10–15)
FOLATE SERPL-MCNC: 19 NG/ML (ref 4.6–34.8)
HCT VFR BLD AUTO: 39.2 % (ref 35–47)
HGB BLD-MCNC: 12.7 G/DL (ref 11.7–15.7)
MCH RBC QN AUTO: 30.5 PG (ref 26.5–33)
MCHC RBC AUTO-ENTMCNC: 32.4 G/DL (ref 31.5–36.5)
MCV RBC AUTO: 94 FL (ref 78–100)
PLATELET # BLD AUTO: 226 10E3/UL (ref 150–450)
RBC # BLD AUTO: 4.16 10E6/UL (ref 3.8–5.2)
WBC # BLD AUTO: 4.6 10E3/UL (ref 4–11)

## 2024-05-17 PROCEDURE — 84443 ASSAY THYROID STIM HORMONE: CPT

## 2024-05-17 PROCEDURE — 82306 VITAMIN D 25 HYDROXY: CPT

## 2024-05-17 PROCEDURE — 80053 COMPREHEN METABOLIC PANEL: CPT

## 2024-05-17 PROCEDURE — 82746 ASSAY OF FOLIC ACID SERUM: CPT

## 2024-05-17 PROCEDURE — 36415 COLL VENOUS BLD VENIPUNCTURE: CPT

## 2024-05-17 PROCEDURE — 82607 VITAMIN B-12: CPT

## 2024-05-17 PROCEDURE — 82728 ASSAY OF FERRITIN: CPT

## 2024-05-17 PROCEDURE — 83550 IRON BINDING TEST: CPT

## 2024-05-17 PROCEDURE — 99214 OFFICE O/P EST MOD 30 MIN: CPT

## 2024-05-17 PROCEDURE — 85027 COMPLETE CBC AUTOMATED: CPT

## 2024-05-17 PROCEDURE — 83540 ASSAY OF IRON: CPT

## 2024-05-17 ASSESSMENT — PAIN SCALES - GENERAL: PAINLEVEL: NO PAIN (0)

## 2024-05-17 ASSESSMENT — ENCOUNTER SYMPTOMS: FATIGUE: 1

## 2024-05-17 ASSESSMENT — PATIENT HEALTH QUESTIONNAIRE - PHQ9
SUM OF ALL RESPONSES TO PHQ QUESTIONS 1-9: 13
10. IF YOU CHECKED OFF ANY PROBLEMS, HOW DIFFICULT HAVE THESE PROBLEMS MADE IT FOR YOU TO DO YOUR WORK, TAKE CARE OF THINGS AT HOME, OR GET ALONG WITH OTHER PEOPLE: EXTREMELY DIFFICULT
SUM OF ALL RESPONSES TO PHQ QUESTIONS 1-9: 13

## 2024-05-17 NOTE — LETTER
May 28, 2024      Hannah E Tietz  [address] [facility] [address]        Dear Ms.Tietz,    We are writing to inform you of your test results.  Your lab work has returned.  CBC shows normal cell counts. No signs of anemia or infection.  Folate, vitamin D, and vitamin B12 were within normal limits. That being said, vitamin D level is just within normal limits, we are coming up on summer so I would recommend some nice sunshine outside.  Thyroid function is within normal limits.  Your complete metabolic panel shows adequate electrolytes and normal kidney and liver function.  You have a normal ferritin level which indicates your iron stores. Your total iron and iron saturation is a little high. If you are supplementing with any iron I would discontinue this. Otherwise, I am not acutely concerned.    Resulted Orders   CBC with platelets   Result Value Ref Range    WBC Count 4.6 4.0 - 11.0 10e3/uL    RBC Count 4.16 3.80 - 5.20 10e6/uL    Hemoglobin 12.7 11.7 - 15.7 g/dL    Hematocrit 39.2 35.0 - 47.0 %    MCV 94 78 - 100 fL    MCH 30.5 26.5 - 33.0 pg    MCHC 32.4 31.5 - 36.5 g/dL    RDW 12.0 10.0 - 15.0 %    Platelet Count 226 150 - 450 10e3/uL   Vitamin D Deficiency   Result Value Ref Range    Vitamin D, Total (25-Hydroxy) 20 20 - 50 ng/mL      Comment:      optimum levels    Narrative    Season, race, dietary intake, and treatment affect the concentration of 25-hydroxy-Vitamin D. Values may decrease during winter months and increase during summer months.    Vitamin D determination is routinely performed by an immunoassay specific for 25 hydroxyvitamin D3.  If an individual is on vitamin D2(ergocalciferol) supplementation, please specify 25 OH vitamin D2 and D3 level determination by LCMSMS test [id number].     Vitamin B12   Result Value Ref Range    Vitamin B12 617 232 - 1,245 pg/mL   Comprehensive metabolic panel (BMP + Alb, Alk Phos, ALT, AST, Total. Bili, TP)   Result Value Ref Range    Sodium 141 135 - 145 mmol/L       Comment:      Reference intervals for this test were updated on 09/26/2023 to more accurately reflect our healthy population. There may be differences in the flagging of prior results with similar values performed with this method. Interpretation of those prior results can be made in the context of the updated reference intervals.     Potassium 4.3 3.4 - 5.3 mmol/L    Carbon Dioxide (CO2) 25 22 - 29 mmol/L    Anion Gap 11 7 - 15 mmol/L    Urea Nitrogen 12.5 6.0 - 20.0 mg/dL    Creatinine 0.79 0.51 - 0.95 mg/dL    GFR Estimate >90 >60 mL/min/1.73m2    Calcium 9.6 8.6 - 10.0 mg/dL    Chloride 105 98 - 107 mmol/L    Glucose 100 (H) 70 - 99 mg/dL    Alkaline Phosphatase 54 40 - 150 U/L      Comment:      Reference intervals for this test were updated on 11/14/2023 to more accurately reflect our healthy population. There may be differences in the flagging of prior results with similar values performed with this method. Interpretation of those prior results can be made in the context of the updated reference intervals.    AST 33 0 - 45 U/L      Comment:      Reference intervals for this test were updated on 6/12/2023 to more accurately reflect our healthy population. There may be differences in the flagging of prior results with similar values performed with this method. Interpretation of those prior results can be made in the context of the updated reference intervals.    ALT 17 0 - 50 U/L      Comment:      Reference intervals for this test were updated on 6/12/2023 to more accurately reflect our healthy population. There may be differences in the flagging of prior results with similar values performed with this method. Interpretation of those prior results can be made in the context of the updated reference intervals.      Protein Total 7.4 6.4 - 8.3 g/dL    Albumin 4.6 3.5 - 5.2 g/dL    Bilirubin Total 0.8 <=1.2 mg/dL   Ferritin   Result Value Ref Range    Ferritin 46 6 - 175 ng/mL   Iron and iron binding capacity    Result Value Ref Range    Iron 164 (H) 37 - 145 ug/dL    Iron Binding Capacity 296 240 - 430 ug/dL    Iron Sat Index 55 (H) 15 - 46 %   Folate   Result Value Ref Range    Folic Acid 19.0 4.6 - 34.8 ng/mL       If you have any questions or concerns, please call the clinic at the number listed above.       Sincerely,      Rina Gray PA-C

## 2024-05-17 NOTE — PROGRESS NOTES
Assessment & Plan     Current moderate episode of major depressive disorder, unspecified whether recurrent (H)  Chronic fatigue  Patient presents today with chronic fatigue. I suspect this is secondary to depression. PHQ-9 today is 13. Known history of mental health reports having tried several medications.  Patient does not want to start any medication today.  I had discussion with the patient that at times medication may be needed to help get through acute phases of depression and/or anxiety and that this is not always chronic thing. She continues to work with a therapist.  I will go ahead and check TSH, CBC, vitamin D, vitamin B12, CMP, ferritin, iron and iron binding capacity, and folate and treat accordingly. Patient is instructed to follow-up with her therapist and take into consideration potentially trialing a medication in the future. No SI/HI. Educated regarding calorie dense foods and incorporating nutritional shakes to improve her caloric intake.  - TSH with free T4 reflex  - CBC with platelets  - Vitamin D Deficiency  - Vitamin B12  - Comprehensive metabolic panel (BMP + Alb, Alk Phos, ALT, AST, Total. Bili, TP)  - Ferritin  - Iron and iron binding capacity  - Folate    Pilar cyst of scalp  Cyst on left back hairline is a pilar cyst.  I am reassured that it is freely mobile upon palpation and there is no pain.  The cyst is also not changing in size and patient has had this since her childhood.  Patient is reassured that it is likely benign in nature and can be cosmetically removed if desired.    Depression Screening Follow Up        5/17/2024     8:12 AM   PHQ   PHQ-9 Total Score 13   Q9: Thoughts of better off dead/self-harm past 2 weeks Not at all     Follow Up Actions Taken  Crisis resource information provided in After Visit Summary  Patient counseled, no additional follow up at this time.     Virginie Julian is a 24 year old, presenting for the following health issues:  Fatigue (Fatigue,  lethargy, low appetite x 8 months. Pt states this has been getting worse lately. Had TSH checked in 2022. Pt states lately she has not had an appetite at all. Pt states if she eats she can keep everything down. ) and Mass (Has lump on neck she would like checked. Lump is on the back of neck. No pain but pt's mom told her to get it looked at. )        5/17/2024     8:20 AM   Additional Questions   Roomed by Salena WAN CMA     Fatigue  Associated symptoms include fatigue.   Mass  Associated symptoms include fatigue.   History of Present Illness       Reason for visit:  Lack of apptite  Symptom onset:  More than a month  Symptoms include:  No appitite and very low energy  Symptom intensity:  Moderate  Symptom progression:  Staying the same  Had these symptoms before:  No  What makes it worse:  No  What makes it better:  No    She eats 0-1 servings of fruits and vegetables daily.She consumes 0 sweetened beverage(s) daily.She exercises with enough effort to increase her heart rate 30 to 60 minutes per day.  She exercises with enough effort to increase her heart rate 5 days per week.   She is taking medications regularly.     Since around 09/2023 patient has had low energy, difficulty getting out of bed. Since 12/2023 symptoms have gotten worse. She has zero energy to cook herself food so she doesn't eat. In addition to this she has zero appetite.     She is currently working with a therapist. She finds this somewhat helpful.   She has tried anxiety and depression medications when she was young. States none of them have worked. She has tried celexa for sure, but cannot think of the other names. She does not want to start any medication today and would rather work through the depression symptoms with her therapist.    There is a bump on the back of her head at the hairline which she would like checked. Has been present since her childhood. Not pruritic, painful, or erythematous. Has not changed in size.       PATIENT HEALTH  "QUESTIONNAIRE-9 (PHQ - 9)    Over the last 2 weeks, how often have you been bothered by any of the following problems?    1. Little interest or pleasure in doing things -  More than half the days   2. Feeling down, depressed, or hopeless -  More than half the days   3. Trouble falling or staying asleep, or sleeping too much - Several days   4. Feeling tired or having little energy -  Nearly every day   5. Poor appetite or overeating -  Nearly every day   6. Feeling bad about yourself - or that you are a failure or have let yourself or your family down -  Several days   7. Trouble concentrating on things, such as reading the newspaper or watching television - Several days   8. Moving or speaking so slowly that other people could have noticed? Or the opposite - being so fidgety or restless that you have been moving around a lot more than usual Not at all   9. Thoughts that you would be better off dead or of hurting  yourself in some way Not at all   Total Score: 13     If you checked off any problems, how difficult have these problems made it for you to do your work, take care of things at home, or get along with other people?      Developed by Bethel Andino, Esperanza Upton, Juan David Mosher and colleagues, with an educational james from [employer] No permission required to reproduce, translate, display or distribute. permission required to reproduce, translate, display or distribute.    Review of Systems  Constitutional, HEENT, cardiovascular, pulmonary, gi and gu systems are negative, except as otherwise noted.        Objective    /75 (BP Location: Right arm, Patient Position: Sitting, Cuff Size: Adult Regular)   Pulse 71   Temp 98.5  F (36.9  C) (Oral)   Resp 10   Ht 1.683 m (5' 6.25\")   Wt 61.4 kg (135 lb 6.4 oz)   LMP 04/27/2024 (Exact Date)   SpO2 100%   Breastfeeding No   BMI 21.69 kg/m    Body mass index is 21.69 kg/m .  Physical Exam   GENERAL: alert and no distress  EYES: Eyes " grossly normal to inspection, conjunctivae and sclerae normal  NECK: no visualized asymmetry, masses, or scars  RESP: normal respiratory effort  SKIN: At the base of the left back hairline there is a single skin colored cyst which is mobile and non-tender upon palpation. There is no erythema. The cyst is not fluctuant but firm.   PSYCH: mentation appears normal, affect normal, at times teary     Results for orders placed or performed in visit on 05/17/24 (from the past 24 hour(s))   CBC with platelets   Result Value Ref Range    WBC Count 4.6 4.0 - 11.0 10e3/uL    RBC Count 4.16 3.80 - 5.20 10e6/uL    Hemoglobin 12.7 11.7 - 15.7 g/dL    Hematocrit 39.2 35.0 - 47.0 %    MCV 94 78 - 100 fL    MCH 30.5 26.5 - 33.0 pg    MCHC 32.4 31.5 - 36.5 g/dL    RDW 12.0 10.0 - 15.0 %    Platelet Count 226 150 - 450 10e3/uL         Signed Electronically by: Rina Quintero PA-C

## 2024-05-18 LAB
ALBUMIN SERPL BCG-MCNC: 4.6 G/DL (ref 3.5–5.2)
ALP SERPL-CCNC: 54 U/L (ref 40–150)
ALT SERPL W P-5'-P-CCNC: 17 U/L (ref 0–50)
ANION GAP SERPL CALCULATED.3IONS-SCNC: 11 MMOL/L (ref 7–15)
AST SERPL W P-5'-P-CCNC: 33 U/L (ref 0–45)
BILIRUB SERPL-MCNC: 0.8 MG/DL
BUN SERPL-MCNC: 12.5 MG/DL (ref 6–20)
CALCIUM SERPL-MCNC: 9.6 MG/DL (ref 8.6–10)
CHLORIDE SERPL-SCNC: 105 MMOL/L (ref 98–107)
CREAT SERPL-MCNC: 0.79 MG/DL (ref 0.51–0.95)
DEPRECATED HCO3 PLAS-SCNC: 25 MMOL/L (ref 22–29)
EGFRCR SERPLBLD CKD-EPI 2021: >90 ML/MIN/1.73M2
FERRITIN SERPL-MCNC: 46 NG/ML (ref 6–175)
GLUCOSE SERPL-MCNC: 100 MG/DL (ref 70–99)
IRON BINDING CAPACITY (ROCHE): 296 UG/DL (ref 240–430)
IRON SATN MFR SERPL: 55 % (ref 15–46)
IRON SERPL-MCNC: 164 UG/DL (ref 37–145)
POTASSIUM SERPL-SCNC: 4.3 MMOL/L (ref 3.4–5.3)
PROT SERPL-MCNC: 7.4 G/DL (ref 6.4–8.3)
SODIUM SERPL-SCNC: 141 MMOL/L (ref 135–145)
TSH SERPL DL<=0.005 MIU/L-ACNC: 1.75 UIU/ML (ref 0.3–4.2)
VIT B12 SERPL-MCNC: 617 PG/ML (ref 232–1245)
VIT D+METAB SERPL-MCNC: 20 NG/ML (ref 20–50)

## 2024-11-24 ENCOUNTER — HEALTH MAINTENANCE LETTER (OUTPATIENT)
Age: 24
End: 2024-11-24

## 2025-04-01 ENCOUNTER — OFFICE VISIT (OUTPATIENT)
Dept: FAMILY MEDICINE | Facility: CLINIC | Age: 25
End: 2025-04-01
Payer: COMMERCIAL

## 2025-04-01 VITALS
TEMPERATURE: 98.1 F | DIASTOLIC BLOOD PRESSURE: 71 MMHG | SYSTOLIC BLOOD PRESSURE: 118 MMHG | WEIGHT: 133 LBS | HEIGHT: 66 IN | BODY MASS INDEX: 21.38 KG/M2 | HEART RATE: 77 BPM | OXYGEN SATURATION: 98 % | RESPIRATION RATE: 10 BRPM

## 2025-04-01 DIAGNOSIS — F32.1 CURRENT MODERATE EPISODE OF MAJOR DEPRESSIVE DISORDER, UNSPECIFIED WHETHER RECURRENT (H): ICD-10-CM

## 2025-04-01 DIAGNOSIS — R05.8 POST-VIRAL COUGH SYNDROME: Primary | ICD-10-CM

## 2025-04-01 PROCEDURE — 99213 OFFICE O/P EST LOW 20 MIN: CPT | Performed by: NURSE PRACTITIONER

## 2025-04-01 PROCEDURE — 3074F SYST BP LT 130 MM HG: CPT | Performed by: NURSE PRACTITIONER

## 2025-04-01 PROCEDURE — 3078F DIAST BP <80 MM HG: CPT | Performed by: NURSE PRACTITIONER

## 2025-04-01 RX ORDER — IBUPROFEN 200 MG
TABLET ORAL
COMMUNITY

## 2025-04-01 RX ORDER — PREDNISONE 20 MG/1
20 TABLET ORAL DAILY
Qty: 5 TABLET | Refills: 0 | Status: SHIPPED | OUTPATIENT
Start: 2025-04-01 | End: 2025-04-06

## 2025-04-01 ASSESSMENT — PATIENT HEALTH QUESTIONNAIRE - PHQ9
SUM OF ALL RESPONSES TO PHQ QUESTIONS 1-9: 5
10. IF YOU CHECKED OFF ANY PROBLEMS, HOW DIFFICULT HAVE THESE PROBLEMS MADE IT FOR YOU TO DO YOUR WORK, TAKE CARE OF THINGS AT HOME, OR GET ALONG WITH OTHER PEOPLE: SOMEWHAT DIFFICULT
SUM OF ALL RESPONSES TO PHQ QUESTIONS 1-9: 5

## 2025-04-01 ASSESSMENT — ENCOUNTER SYMPTOMS: COUGH: 1

## 2025-04-01 NOTE — PROGRESS NOTES
Assessment & Plan     Post-viral cough syndrome  **  - predniSONE (DELTASONE) 20 MG tablet  Dispense: 5 tablet; Refill: 0    Current moderate episode of major depressive disorder, unspecified whether recurrent (H)  **    Conservative measures and warning signs when to seek follow up. \  SELF MONITORING:    Will continue to follow up with PCP for chronic conditions.     Subjective   Iesha is a 25 year old, presenting for the following health issues:  Cough (On going cough x 2 months.)      4/1/2025     1:37 PM   Additional Questions   Roomed by Arpita LEOS     - beginning march hacking like cough in AM, body aches are better. No fevers. No sob. Eating okay. No asthma or allergies.   - yesterday noted right rib cage pain. No chest pain, no heart palpation, no syncope.   - notes mental health is stable      Cough    History of Present Illness       Reason for visit:  Cough and chest pain  Symptom onset:  More than a month  Symptoms include:  Cough, started dry but more mucus like cough, pain in right side of ribcage  Symptom intensity:  Moderate  Symptom progression:  Worsening  Had these symptoms before:  No  What makes it worse:  Coughing  What makes it better:  No   She is taking medications regularly.                      Objective    There were no vitals taken for this visit.  There is no height or weight on file to calculate BMI.  Physical Exam   GENERAL: alert and no distress  EYES: Eyes grossly normal to inspection, PERRL and conjunctivae and sclerae normal  HENT: ear canals and TM's normal, nose and mouth without ulcers or lesions  NECK: no adenopathy, no asymmetry, masses, or scars  RESP: lungs clear to auscultation - no rales, rhonchi or wheezes  s noted, no edema  SKIN: no suspicious lesions or rashes  NEURO: Normal strength and tone, mentation intact and speech normal  PSYCH: mentation appears normal, affect normal/bright    Office Visit on 05/17/2024   Component Date Value Ref Range Status    TSH  05/17/2024 1.75  0.30 - 4.20 uIU/mL Final    WBC Count 05/17/2024 4.6  4.0 - 11.0 10e3/uL Final    RBC Count 05/17/2024 4.16  3.80 - 5.20 10e6/uL Final    Hemoglobin 05/17/2024 12.7  11.7 - 15.7 g/dL Final    Hematocrit 05/17/2024 39.2  35.0 - 47.0 % Final    MCV 05/17/2024 94  78 - 100 fL Final    MCH 05/17/2024 30.5  26.5 - 33.0 pg Final    MCHC 05/17/2024 32.4  31.5 - 36.5 g/dL Final    RDW 05/17/2024 12.0  10.0 - 15.0 % Final    Platelet Count 05/17/2024 226  150 - 450 10e3/uL Final    Vitamin D, Total (25-Hydroxy) 05/17/2024 20  20 - 50 ng/mL Final    optimum levels    Vitamin B12 05/17/2024 617  232 - 1,245 pg/mL Final    Sodium 05/17/2024 141  135 - 145 mmol/L Final    Reference intervals for this test were updated on 09/26/2023 to more accurately reflect our healthy population. There may be differences in the flagging of prior results with similar values performed with this method. Interpretation of those prior results can be made in the context of the updated reference intervals.     Potassium 05/17/2024 4.3  3.4 - 5.3 mmol/L Final    Carbon Dioxide (CO2) 05/17/2024 25  22 - 29 mmol/L Final    Anion Gap 05/17/2024 11  7 - 15 mmol/L Final    Urea Nitrogen 05/17/2024 12.5  6.0 - 20.0 mg/dL Final    Creatinine 05/17/2024 0.79  0.51 - 0.95 mg/dL Final    GFR Estimate 05/17/2024 >90  >60 mL/min/1.73m2 Final    Calcium 05/17/2024 9.6  8.6 - 10.0 mg/dL Final    Chloride 05/17/2024 105  98 - 107 mmol/L Final    Glucose 05/17/2024 100 (H)  70 - 99 mg/dL Final    Alkaline Phosphatase 05/17/2024 54  40 - 150 U/L Final    Reference intervals for this test were updated on 11/14/2023 to more accurately reflect our healthy population. There may be differences in the flagging of prior results with similar values performed with this method. Interpretation of those prior results can be made in the context of the updated reference intervals.    AST 05/17/2024 33  0 - 45 U/L Final    Reference intervals for this test were  updated on 6/12/2023 to more accurately reflect our healthy population. There may be differences in the flagging of prior results with similar values performed with this method. Interpretation of those prior results can be made in the context of the updated reference intervals.    ALT 05/17/2024 17  0 - 50 U/L Final    Reference intervals for this test were updated on 6/12/2023 to more accurately reflect our healthy population. There may be differences in the flagging of prior results with similar values performed with this method. Interpretation of those prior results can be made in the context of the updated reference intervals.      Protein Total 05/17/2024 7.4  6.4 - 8.3 g/dL Final    Albumin 05/17/2024 4.6  3.5 - 5.2 g/dL Final    Bilirubin Total 05/17/2024 0.8  <=1.2 mg/dL Final    Ferritin 05/17/2024 46  6 - 175 ng/mL Final    Iron 05/17/2024 164 (H)  37 - 145 ug/dL Final    Iron Binding Capacity 05/17/2024 296  240 - 430 ug/dL Final    Iron Sat Index 05/17/2024 55 (H)  15 - 46 % Final    Folic Acid 05/17/2024 19.0  4.6 - 34.8 ng/mL Final           Signed Electronically by: CONNIE Fuentes CNP

## 2025-04-03 PROBLEM — F32.1 CURRENT MODERATE EPISODE OF MAJOR DEPRESSIVE DISORDER, UNSPECIFIED WHETHER RECURRENT (H): Status: ACTIVE | Noted: 2025-04-03
